# Patient Record
Sex: MALE | Race: WHITE | NOT HISPANIC OR LATINO | Employment: UNEMPLOYED | URBAN - METROPOLITAN AREA
[De-identification: names, ages, dates, MRNs, and addresses within clinical notes are randomized per-mention and may not be internally consistent; named-entity substitution may affect disease eponyms.]

---

## 2019-12-30 ENCOUNTER — HOSPITAL ENCOUNTER (EMERGENCY)
Facility: HOSPITAL | Age: 54
Discharge: HOME | End: 2019-12-30
Attending: EMERGENCY MEDICINE
Payer: OTHER GOVERNMENT

## 2019-12-30 VITALS
TEMPERATURE: 98 F | WEIGHT: 217 LBS | OXYGEN SATURATION: 97 % | SYSTOLIC BLOOD PRESSURE: 137 MMHG | BODY MASS INDEX: 27.85 KG/M2 | RESPIRATION RATE: 18 BRPM | HEIGHT: 74 IN | HEART RATE: 89 BPM | DIASTOLIC BLOOD PRESSURE: 74 MMHG

## 2019-12-30 DIAGNOSIS — S61.512A LACERATION OF LEFT WRIST, INITIAL ENCOUNTER: Primary | ICD-10-CM

## 2019-12-30 PROCEDURE — 25000003 PHARM REV CODE 250: Performed by: EMERGENCY MEDICINE

## 2019-12-30 PROCEDURE — 99284 EMERGENCY DEPT VISIT MOD MDM: CPT | Mod: 25

## 2019-12-30 PROCEDURE — 99283 EMERGENCY DEPT VISIT LOW MDM: CPT | Mod: 25,,, | Performed by: EMERGENCY MEDICINE

## 2019-12-30 PROCEDURE — 12002 PR RESUP NPTERF WND BODY 2.6-7.5 CM: ICD-10-PCS | Mod: LT,,, | Performed by: EMERGENCY MEDICINE

## 2019-12-30 PROCEDURE — 99283 PR EMERGENCY DEPT VISIT,LEVEL III: ICD-10-PCS | Mod: 25,,, | Performed by: EMERGENCY MEDICINE

## 2019-12-30 PROCEDURE — 12002 RPR S/N/AX/GEN/TRNK2.6-7.5CM: CPT | Mod: LT,,, | Performed by: EMERGENCY MEDICINE

## 2019-12-30 PROCEDURE — 12002 RPR S/N/AX/GEN/TRNK2.6-7.5CM: CPT | Mod: LT

## 2019-12-30 RX ORDER — BACITRACIN ZINC 500 UNIT/G
1 OINTMENT IN PACKET (EA) TOPICAL
Status: DISCONTINUED | OUTPATIENT
Start: 2019-12-30 | End: 2019-12-30 | Stop reason: HOSPADM

## 2019-12-30 RX ORDER — BACITRACIN ZINC 500 UNIT/G
OINTMENT (GRAM) TOPICAL
Status: DISCONTINUED | OUTPATIENT
Start: 2019-12-30 | End: 2019-12-30

## 2019-12-30 RX ORDER — LIDOCAINE HYDROCHLORIDE 10 MG/ML
1 INJECTION INFILTRATION; PERINEURAL
Status: COMPLETED | OUTPATIENT
Start: 2019-12-30 | End: 2019-12-30

## 2019-12-30 RX ADMIN — LIDOCAINE HYDROCHLORIDE 1 ML: 10 INJECTION, SOLUTION INFILTRATION; PERINEURAL at 12:12

## 2019-12-30 NOTE — ED TRIAGE NOTES
Pt reports using chisel at work today. Chisel slipped and cut left wrist. 2.5cm lac with controlled bleeding. Denies N/V/F/D.

## 2019-12-30 NOTE — DISCHARGE INSTRUCTIONS
Follow-up with your primary care doctor the next 7-10 days for recheck and suture removal.  Return to the emergency department for any worsening signs or symptoms.   
2 weeks

## 2019-12-30 NOTE — ED PROVIDER NOTES
Encounter Date: 12/30/2019    SCRIBE #1 NOTE: I, Adam Mccloud, am scribing for, and in the presence of, Dr. Lee.       History     Chief Complaint   Patient presents with    Laceration     using a chisel and it his L wrist, bleeding controlled.       54 y.o. Male with no documented PMHx presents with a chief complaint of laceration. Pt reports he was using a chisel and injured his left wrist. Pt bleeding is controlled. Pt endorses tobacco use. Denies Drug use or alcohol use.     The history is provided by the patient.     Review of patient's allergies indicates:   Allergen Reactions    Codeine Anaphylaxis    Demerol [meperidine] Anaphylaxis    Dilaudid (pf) [hydromorphone (pf)] Anaphylaxis    Morphine Anaphylaxis    Nubain [nalbuphine] Anaphylaxis    Valium [diazepam] Anaphylaxis     History reviewed. No pertinent past medical history.  Past Surgical History:   Procedure Laterality Date    ABDOMINAL SURGERY      BACK SURGERY      HERNIA REPAIR       History reviewed. No pertinent family history.  Social History     Tobacco Use    Smoking status: Current Every Day Smoker     Packs/day: 0.50     Types: Cigarettes   Substance Use Topics    Alcohol use: Not Currently    Drug use: Not on file     Review of Systems   Constitutional: Negative for fever.   HENT: Negative for sore throat.    Respiratory: Negative for shortness of breath.    Cardiovascular: Negative for chest pain.   Gastrointestinal: Negative for nausea.   Genitourinary: Negative for dysuria.   Musculoskeletal: Negative for back pain.   Skin: Positive for wound. Negative for rash.   Neurological: Negative for weakness.   Hematological: Does not bruise/bleed easily.   All other systems reviewed and are negative.      Physical Exam     Initial Vitals [12/30/19 1136]   BP Pulse Resp Temp SpO2   137/74 89 18 98.3 °F (36.8 °C) 97 %      MAP       --         Vitals:    12/30/19 1136   BP: 137/74   Pulse: 89   Resp: 18   Temp: 98.3 °F (36.8 °C)  "  TempSrc: Oral   SpO2: 97%   Weight: 98.4 kg (217 lb)   Height: 6' 2" (1.88 m)     Physical Exam    Nursing note and vitals reviewed.  Constitutional: He appears well-developed and well-nourished. He appears distressed.   HENT:   Head: Normocephalic and atraumatic.   Mouth/Throat: Oropharynx is clear and moist.   Eyes: Conjunctivae and EOM are normal. Pupils are equal, round, and reactive to light.   Neck: Normal range of motion. Neck supple.   Cardiovascular: Normal rate and regular rhythm.   Pulmonary/Chest: Breath sounds normal. No respiratory distress.   Abdominal: Soft. He exhibits no distension. There is no tenderness.   Musculoskeletal: Normal range of motion.   Neurological: He is alert and oriented to person, place, and time. He has normal strength.   Skin: Skin is warm and dry.   2cm laceration on dorsal lateral aspect of wrist, nuerovascularly intact distally.    Psychiatric: His behavior is normal. Thought content normal.         ED Course   Lac Repair  Date/Time: 12/30/2019 12:45 PM  Performed by: Franklin Lee MD  Authorized by: Franklin Lee MD   Consent Done: Not Needed  Body area: upper extremity  Location details: left wrist  Laceration length: 3 cm  Contaminated: not contaminated.  Foreign bodies: no foreign bodies  Tendon involvement: none  Nerve involvement: none  Vascular damage: no  Anesthesia: local infiltration    Anesthesia:  Local Anesthetic: lidocaine 1% without epinephrine  Anesthetic total: 4 mL  Patient sedated: no  Preparation: Patient was prepped and draped in the usual sterile fashion.  Irrigation solution: saline  Irrigation method: syringe  Amount of cleaning: extensive  Debridement: none  Degree of undermining: none  Skin closure: 5-0 nylon  Number of sutures: 5  Technique: simple  Approximation: close  Approximation difficulty: simple  Dressing: non-stick sterile dressing and splint  Patient tolerance: Patient tolerated the procedure well with no immediate " complications  Comments: velcro cock-up splint applied by nurse.  NVI distally post proc.        Labs Reviewed - No data to display       Imaging Results    None          Medical Decision Making:   History:   Old Medical Records: I decided to obtain old medical records.  Initial Assessment:   Pt presents with 2cm laceration to left wrist. Will suture the wound.   ED Management:  12:50PM  5 interrupted sutures.             Scribe Attestation:   Scribe #1: I performed the above scribed service and the documentation accurately describes the services I performed. I attest to the accuracy of the note.               I discussed wound care precautions with patient and/or family/caretaker; specifically that all wounds have risk of infection despite efforts to cleanse and debride the wound; and there is a risk of an occult foreign body (and thus increased risk of infection) despite a negative examination.  I discussed with patient need to return for any signs of infection, specifically redness, increased pain, fever, drainage of pus, or any concern, immediately.           Clinical Impression:       ICD-10-CM ICD-9-CM   1. Laceration of left wrist, initial encounter S61.512A 881.02         Disposition:   Disposition: Discharged  Condition: Stable                     Franklin Lee MD  12/31/19 1029